# Patient Record
Sex: FEMALE | Race: WHITE | NOT HISPANIC OR LATINO
[De-identification: names, ages, dates, MRNs, and addresses within clinical notes are randomized per-mention and may not be internally consistent; named-entity substitution may affect disease eponyms.]

---

## 2020-10-08 PROBLEM — Z00.00 ENCOUNTER FOR PREVENTIVE HEALTH EXAMINATION: Status: ACTIVE | Noted: 2020-10-08

## 2020-10-14 ENCOUNTER — APPOINTMENT (OUTPATIENT)
Dept: PULMONOLOGY | Facility: CLINIC | Age: 80
End: 2020-10-14
Payer: MEDICARE

## 2020-10-14 VITALS
OXYGEN SATURATION: 98 % | TEMPERATURE: 97.4 F | SYSTOLIC BLOOD PRESSURE: 116 MMHG | HEIGHT: 58 IN | BODY MASS INDEX: 27.08 KG/M2 | DIASTOLIC BLOOD PRESSURE: 82 MMHG | WEIGHT: 129 LBS | HEART RATE: 94 BPM

## 2020-10-14 DIAGNOSIS — I10 ESSENTIAL (PRIMARY) HYPERTENSION: ICD-10-CM

## 2020-10-14 DIAGNOSIS — R06.02 SHORTNESS OF BREATH: ICD-10-CM

## 2020-10-14 PROCEDURE — 71046 X-RAY EXAM CHEST 2 VIEWS: CPT

## 2020-10-14 PROCEDURE — 99204 OFFICE O/P NEW MOD 45 MIN: CPT | Mod: 25,GC

## 2020-10-14 NOTE — PHYSICAL EXAM
[Normal Oropharynx] : normal oropharynx [No Acute Distress] : no acute distress [IV] : Mallampati Class: IV [Normal Appearance] : normal appearance [No Neck Mass] : no neck mass [Normal Rate/Rhythm] : normal rate/rhythm [Normal S1, S2] : normal s1, s2 [No Murmurs] : no murmurs [No Resp Distress] : no resp distress [Clear to Auscultation Bilaterally] : clear to auscultation bilaterally [Benign] : benign [No Abnormalities] : no abnormalities [Normal Gait] : normal gait [No Clubbing] : no clubbing [No Cyanosis] : no cyanosis [No Edema] : no edema [No Focal Deficits] : no focal deficits [Normal Color/ Pigmentation] : normal color/ pigmentation [Oriented x3] : oriented x3 [Normal Affect] : normal affect

## 2020-10-27 ENCOUNTER — LABORATORY RESULT (OUTPATIENT)
Age: 80
End: 2020-10-27

## 2020-10-28 ENCOUNTER — TRANSCRIPTION ENCOUNTER (OUTPATIENT)
Age: 80
End: 2020-10-28

## 2020-10-29 ENCOUNTER — APPOINTMENT (OUTPATIENT)
Dept: PULMONOLOGY | Facility: CLINIC | Age: 80
End: 2020-10-29
Payer: MEDICARE

## 2020-10-29 PROCEDURE — 94727 GAS DIL/WSHOT DETER LNG VOL: CPT

## 2020-10-29 PROCEDURE — 94010 BREATHING CAPACITY TEST: CPT

## 2020-10-29 PROCEDURE — 94729 DIFFUSING CAPACITY: CPT

## 2020-11-02 NOTE — HISTORY OF PRESENT ILLNESS
[Former] : former [>= 30 pack years] : >= 30 pack years [Never] : never [TextBox_4] : 10/14/2020 [Moy]: Asked to evaluate patient by Dr Damion Andres for dyspnea. Daughter with her during this visit. \par This is a 80 yr old women former smoker , HTN and hyperlipemia presents with c/o THAKUR. After long discussion involving her and her daughter it seems she has been having THAKUR for years but feels it more now as she is walking more instead of taking cabs. NYHA 2 , walks 1 1/2 block, 5 step of stair. No orthopnea / PND / leg swelling / chest tightness / CP. She has always had dry cough but more now and is being treated with nasal spray for allergies. Denies fever / weight loss/ wheezing. Snoring + but no witnessed apnea / day time sleepiness.\par Former smoker : 40 pck yrs or more, quit 20 yrs ago ,never been admitted with breathing issues , no formal dx of asthma or COPD. \par No e cigs or vaping, no pets , no allergies. \par Has a cardiologist , echo last yr was normal apparently , last visit with cards was 2 months ago and was told things are normal.  \par  [YearQuit] : 2001

## 2020-11-02 NOTE — REVIEW OF SYSTEMS
[Nasal Congestion] : nasal congestion [Sinus Problems] : sinus problems [Cough] : cough [SOB on Exertion] : sob on exertion [Fever] : no fever [Fatigue] : no fatigue [Recent Wt Gain (___ Lbs)] : ~T no recent weight gain [Chills] : no chills [Poor Appetite] : no poor appetite [Recent Wt Loss (___ Lbs)] : ~T no recent weight loss [Epistaxis] : no epistaxis [Eye Irritation] : no eye irritation [Postnasal Drip] : no postnasal drip [Poor Dentition] : no poor dentition [Hemoptysis] : no hemoptysis [Chest Tightness] : no chest tightness [Sputum] : no sputum [Dyspnea] : no dyspnea [Pleuritic Pain] : no pleuritic pain [Wheezing] : no wheezing [A.M. Dry Mouth] : no a.m. dry mouth [Chest Discomfort] : no chest discomfort [Claudication] : no claudication [Orthopnea] : no orthopnea [Palpitations] : no palpitations [Syncope] : no syncope [Hay Fever] : no hay fever [Nasal Discharge] : no nasal discharge [Back Pain] : no back pain [Rash] : no rash [Easy Bruising] : no easy bruising [Headache] : no headache [Dizziness] : no dizziness [Numbness] : no numbness [Confusion] : no confusion [Depression] : no depression [Panic Attacks] : no panic attacks [Diabetes] : no diabetes

## 2020-11-02 NOTE — CONSULT LETTER
[Dear  ___] : Dear  [unfilled], [Please see my note below.] : Please see my note below. [( Thank you for referring [unfilled] for consultation for _____ )] : Thank you for referring [unfilled] for consultation for [unfilled] [Consult Closing:] : Thank you very much for allowing me to participate in the care of this patient.  If you have any questions, please do not hesitate to contact me. [Sincerely,] : Sincerely, [FreeTextEntry2] : Damion Andres MD\par [address]\[place] [address] [FreeTextEntry3] : Cari Willingham MD, FCCP\par

## 2020-11-02 NOTE — REASON FOR VISIT
[Consultation] : a consultation [Shortness of Breath] : shortness of breath [Cough] : cough [Family Member] : family member [TextBox_13] : Dr Damion Andres

## 2021-10-06 NOTE — ASSESSMENT
[FreeTextEntry1] : Data reviewed:\par \par PA/lat CXR in office 10/14/2020 : clear lungs\par \par Impression:\par THAKUR \par Former smoker\par \par Plan:\par Subacute on chronic, does not seem to be related to  cardiac disease. Significant smoking hx puts her high risk of having emphysema / COPD. CXR looks normal with no hyperinflation with slightly enlarged cardiac size. \par Would get full PFT to see obstructive pattern if any and need of inhaler accordingly. There is a possible component of deconditioning as well. Per patient ECHO was normal last yr , don’t have access to the report. right heart border  on CXR is normal pointing towards normal pulmonary pressures but would be great if she can get us the report. \par Would discuss possible sleep study for sleep disordered breathing next visit. \par \par \par --\par Attending Addendum\par \par Seen and examined by me and agree w above. Comes in w progressive THAKUR without cough or wheeze - daughter thinks this is old, patient reports worsening. Former smoker, 40py, and has gained weight during pandemic. Chest is clear, CXR is clear. May be due to age, weight gain, but will obtain full PFT to exclude COPD given smoking hx.\par --\par PFT in office 10/29/20: no obstruction, restriction w TLC 41% but this is out of proportion to spirometry, DLCO 41%\par Reviewed w pt who had many complaints about service in office, sees a [facility] primary physician. (I think part of the problem is that we had an incorrect phone number, which she did not correct on her intake. She got a Covid swab without having a PFT appointment, but our office did accommodate her by doing the test here in our office though we are in the process of setting up our new equipment.) Medically, I would recommend a CT chest to exclude emphysema. Socially, I can certainly refer her to a [facility] pulmonologist as I agree - we do not provide [facility] service here. She stated that she will discuss a CT with her primary physician.
Malnutrition...

## 2021-10-26 ENCOUNTER — APPOINTMENT (OUTPATIENT)
Dept: OTOLARYNGOLOGY | Facility: CLINIC | Age: 81
End: 2021-10-26
Payer: MEDICARE

## 2021-10-26 VITALS
HEART RATE: 98 BPM | BODY MASS INDEX: 27.67 KG/M2 | HEIGHT: 56 IN | OXYGEN SATURATION: 100 % | DIASTOLIC BLOOD PRESSURE: 109 MMHG | SYSTOLIC BLOOD PRESSURE: 153 MMHG | TEMPERATURE: 97.9 F | WEIGHT: 123 LBS

## 2021-10-26 DIAGNOSIS — Z82.49 FAMILY HISTORY OF ISCHEMIC HEART DISEASE AND OTHER DISEASES OF THE CIRCULATORY SYSTEM: ICD-10-CM

## 2021-10-26 DIAGNOSIS — H69.80 OTHER SPECIFIED DISORDERS OF EUSTACHIAN TUBE, UNSPECIFIED EAR: ICD-10-CM

## 2021-10-26 DIAGNOSIS — Z87.448 PERSONAL HISTORY OF OTHER DISEASES OF URINARY SYSTEM: ICD-10-CM

## 2021-10-26 DIAGNOSIS — H91.90 UNSPECIFIED HEARING LOSS, UNSPECIFIED EAR: ICD-10-CM

## 2021-10-26 DIAGNOSIS — H92.02 OTALGIA, LEFT EAR: ICD-10-CM

## 2021-10-26 DIAGNOSIS — Z87.891 PERSONAL HISTORY OF NICOTINE DEPENDENCE: ICD-10-CM

## 2021-10-26 PROCEDURE — 31231 NASAL ENDOSCOPY DX: CPT

## 2021-10-26 PROCEDURE — 92557 COMPREHENSIVE HEARING TEST: CPT

## 2021-10-26 PROCEDURE — 99204 OFFICE O/P NEW MOD 45 MIN: CPT | Mod: 25

## 2021-10-26 PROCEDURE — 92550 TYMPANOMETRY & REFLEX THRESH: CPT

## 2021-10-26 NOTE — ASSESSMENT
[FreeTextEntry1] : l chronic serous otitis\par on abx 1 week \par also using flonase\par taught how to autoinsufflate - it helps slightly\par rtc 7-10 d - if not better, can perform myringotomy +/- pe tube\par I explained risks benefits and alts and offered today but she preferred to give it more time

## 2021-10-26 NOTE — HISTORY OF PRESENT ILLNESS
[de-identified] : 80yo F with l hl and tinnitus x 8-9d. No inciting event. She saw Dr Gigi Coello a week ago and he gave her abx and it persists. She has not had this in the past. No inciting factor, no uri. no fh or sh relevant to cc. she has h/o chronic renal failure and is about to go on dialysis.

## 2021-10-26 NOTE — PHYSICAL EXAM
[Nasal Endoscopy Performed] : nasal endoscopy was performed, see procedure section for findings [Midline] : trachea located in midline position [Normal] : no rashes [de-identified] : l serous otitis media [de-identified] : gait steady

## 2021-10-26 NOTE — DATA REVIEWED
[de-identified] : l mhl r snhl bone lines similar - l conductive component and flat tymp reviewed with pt

## 2021-10-26 NOTE — PROCEDURE
[Posterior Lesion] : posterior lesion [Anterior rhinoscopy insufficient to account for symptoms] : anterior rhinoscopy insufficient to account for symptoms [Flexible Endoscope] : examined with the flexible endoscope [Serial Number: ___] : Serial Number: [unfilled] [Normal] : the paranasal sinuses had no abnormalities [FreeTextEntry6] : done to r/o naspharyngeal tumor causing l syed\par clear

## 2021-11-02 ENCOUNTER — APPOINTMENT (OUTPATIENT)
Dept: OTOLARYNGOLOGY | Facility: CLINIC | Age: 81
End: 2021-11-02
Payer: MEDICARE

## 2021-11-02 VITALS
DIASTOLIC BLOOD PRESSURE: 70 MMHG | SYSTOLIC BLOOD PRESSURE: 138 MMHG | HEART RATE: 68 BPM | TEMPERATURE: 97.7 F | OXYGEN SATURATION: 98 %

## 2021-11-02 DIAGNOSIS — H90.A32 MIXED CONDUCTIVE AND SENSORINEURAL HEARING, UNILATERAL, LEFT EAR WITH RESTRICTED HEARING ON THE  CONTRALATERAL SIDE: ICD-10-CM

## 2021-11-02 DIAGNOSIS — H65.22 CHRONIC SEROUS OTITIS MEDIA, LEFT EAR: ICD-10-CM

## 2021-11-02 PROCEDURE — 99213 OFFICE O/P EST LOW 20 MIN: CPT | Mod: 25

## 2021-11-02 PROCEDURE — 69420 INCISION OF EARDRUM: CPT | Mod: LT

## 2021-11-02 RX ORDER — OFLOXACIN OTIC 3 MG/ML
0.3 SOLUTION AURICULAR (OTIC) TWICE DAILY
Qty: 1 | Refills: 1 | Status: ACTIVE | COMMUNITY
Start: 2021-11-02 | End: 1900-01-01

## 2021-11-02 NOTE — PHYSICAL EXAM
[Normal] : assessment of respiratory effort is normal [de-identified] : jaylan farfan;l syed [de-identified] : gait steady

## 2021-11-02 NOTE — HISTORY OF PRESENT ILLNESS
[de-identified] : followup 82 yo F found to have l syed, took abx and tried flonase bid for a week without improvement. She wishes to see what else can be done. L ear feels blocked with hl>r

## 2021-11-02 NOTE — ASSESSMENT
[FreeTextEntry1] : l syed\par discussed risks benefits and alts to l myringotomy vs pe tube - she wants myringotomy alone\par done\par dep\par drops\par rtc 1 week

## 2021-11-02 NOTE — PROCEDURE
[Risk and Benefits Discussed] : The purpose, risks, discomforts, benefits and alternatives of the procedure have been explained to the patient including no treatment. [Same] : same as the Pre Op Dx. [] : Myringotomy [FreeTextEntry6] : serous fluid suctioned- ear felt better

## 2021-11-17 ENCOUNTER — APPOINTMENT (OUTPATIENT)
Dept: OTOLARYNGOLOGY | Facility: CLINIC | Age: 81
End: 2021-11-17